# Patient Record
Sex: FEMALE | Race: WHITE | NOT HISPANIC OR LATINO | Employment: PART TIME | URBAN - METROPOLITAN AREA
[De-identification: names, ages, dates, MRNs, and addresses within clinical notes are randomized per-mention and may not be internally consistent; named-entity substitution may affect disease eponyms.]

---

## 2018-05-31 ENCOUNTER — HOSPITAL ENCOUNTER (EMERGENCY)
Facility: HOSPITAL | Age: 38
Discharge: HOME | End: 2018-06-01
Attending: EMERGENCY MEDICINE | Admitting: EMERGENCY MEDICINE

## 2018-05-31 ENCOUNTER — APPOINTMENT (EMERGENCY)
Dept: RADIOLOGY | Facility: HOSPITAL | Age: 38
End: 2018-05-31

## 2018-05-31 VITALS
RESPIRATION RATE: 20 BRPM | BODY MASS INDEX: 25.71 KG/M2 | HEART RATE: 88 BPM | SYSTOLIC BLOOD PRESSURE: 126 MMHG | TEMPERATURE: 98.1 F | HEIGHT: 66 IN | DIASTOLIC BLOOD PRESSURE: 75 MMHG | OXYGEN SATURATION: 98 % | WEIGHT: 160 LBS

## 2018-05-31 DIAGNOSIS — S92.411A: Primary | ICD-10-CM

## 2018-05-31 PROCEDURE — 73630 X-RAY EXAM OF FOOT: CPT

## 2018-05-31 RX ORDER — ACETAMINOPHEN 325 MG/1
650 TABLET ORAL ONCE
Status: COMPLETED | OUTPATIENT
Start: 2018-05-31 | End: 2018-05-31

## 2018-05-31 RX ADMIN — ACETAMINOPHEN 650 MG: 325 TABLET ORAL at 23:09

## 2018-06-01 PROCEDURE — 99283 EMERGENCY DEPT VISIT LOW MDM: CPT

## 2018-06-01 NOTE — ED PROVIDER NOTES
History  Chief Complaint   Patient presents with    Toe Injury     Patient presents to ED with c/o of a right great toe injury  States she hit her bed last night and has had severe pain and swelling  Non weightbearing     28-year-old female here for right great toe injury that occurred last night after accidentally kicking a bedpost   She tried managing pain at home but it is getting worse  She notes swelling and bruising  Pain is worse with weight-bearing and movement of the right great toe  No prior injuries surgeries this area  She has tried Motrin with no relief  Denies numbness tingling weakness  No lacerations or abrasions  No other injuries reported  No ankle pain  No knee pain  History provided by:  Patient   used: No    Toe Pain   Location:  Right toe injury  Quality:  Aching pain   Severity:  Mild  Onset quality:  Gradual  Duration:  1 day  Timing:  Constant  Progression:  Unchanged  Chronicity:  New  Context:  Accidentally kicked bed post  Relieved by:  Rest  Worsened by: Movement, weight bearing  Ineffective treatments:  Motrin  Associated symptoms: no abdominal pain, no chest pain, no congestion, no cough, no diarrhea, no ear pain, no fatigue, no fever, no headaches, no loss of consciousness, no myalgias, no nausea, no rash, no rhinorrhea, no shortness of breath, no sore throat, no vomiting and no wheezing        None       History reviewed  No pertinent past medical history  Past Surgical History:   Procedure Laterality Date    LUNG SURGERY         History reviewed  No pertinent family history  I have reviewed and agree with the history as documented  Social History   Substance Use Topics    Smoking status: Current Every Day Smoker     Packs/day: 1 00     Types: Cigarettes    Smokeless tobacco: Never Used    Alcohol use Yes      Comment: occ          Review of Systems   Constitutional: Negative for activity change, appetite change, chills, diaphoresis, fatigue, fever and unexpected weight change  HENT: Negative for congestion, ear pain, rhinorrhea, sinus pressure, sore throat and trouble swallowing  Eyes: Negative for photophobia and visual disturbance  Respiratory: Negative for apnea, cough, choking, chest tightness, shortness of breath, wheezing and stridor  Cardiovascular: Negative for chest pain, palpitations and leg swelling  Gastrointestinal: Negative for abdominal distention, abdominal pain, blood in stool, constipation, diarrhea, nausea and vomiting  Genitourinary: Negative for decreased urine volume, difficulty urinating, dysuria, enuresis, flank pain, frequency, hematuria and urgency  Musculoskeletal: Negative for arthralgias, myalgias, neck pain and neck stiffness  Right great toe injury   Skin: Negative for color change, pallor, rash and wound  Allergic/Immunologic: Negative  Neurological: Negative for dizziness, tremors, loss of consciousness, syncope, weakness, light-headedness, numbness and headaches  Hematological: Negative  Psychiatric/Behavioral: Negative  All other systems reviewed and are negative  Physical Exam  Physical Exam   Constitutional: She is oriented to person, place, and time  She appears well-developed and well-nourished  Non-toxic appearance  She does not have a sickly appearance  She does not appear ill  No distress  HENT:   Head: Normocephalic and atraumatic  Eyes: EOM and lids are normal  Pupils are equal, round, and reactive to light  Neck: Normal range of motion  Neck supple  Cardiovascular: Normal rate, regular rhythm, S1 normal, S2 normal, normal heart sounds, intact distal pulses and normal pulses  Exam reveals no gallop, no distant heart sounds, no friction rub and no decreased pulses  No murmur heard  Pulses:       Radial pulses are 2+ on the right side, and 2+ on the left side  Pulmonary/Chest: Effort normal and breath sounds normal  No accessory muscle usage   No apnea, no tachypnea and no bradypnea  No respiratory distress  She has no decreased breath sounds  She has no wheezes  She has no rhonchi  She has no rales  Abdominal: Soft  Normal appearance  She exhibits no distension and no mass  There is no tenderness  There is no rigidity, no rebound and no guarding  No hernia  Musculoskeletal: Normal range of motion  She exhibits no edema or deformity  Right foot: There is tenderness, bony tenderness and swelling  There is normal range of motion, normal capillary refill, no crepitus, no deformity and no laceration  Feet:    Neurological: She is alert and oriented to person, place, and time  No cranial nerve deficit  GCS eye subscore is 4  GCS verbal subscore is 5  GCS motor subscore is 6  GCS 15  AAOx3  Ambulating in department without difficulty  CN II-XII grossly intact  No focal neuro deficits  Skin: Skin is warm, dry and intact  No rash noted  She is not diaphoretic  No erythema  No pallor  Psychiatric: Her speech is normal    Nursing note and vitals reviewed        Vital Signs  ED Triage Vitals   Temperature Pulse Respirations Blood Pressure SpO2   05/31/18 2238 05/31/18 2238 05/31/18 2238 05/31/18 2238 05/31/18 2238   98 1 °F (36 7 °C) 88 20 126/75 98 %      Temp Source Heart Rate Source Patient Position - Orthostatic VS BP Location FiO2 (%)   05/31/18 2238 05/31/18 2238 05/31/18 2238 05/31/18 2238 --   Oral Monitor Sitting Right arm       Pain Score       05/31/18 2236       8           Vitals:    05/31/18 2238   BP: 126/75   Pulse: 88   Patient Position - Orthostatic VS: Sitting       Visual Acuity      ED Medications  Medications   acetaminophen (TYLENOL) tablet 650 mg (650 mg Oral Given 5/31/18 2309)       Diagnostic Studies  Results Reviewed     None                 XR foot 3+ views RIGHT   ED Interpretation by Kenroy Hanson PA-C (05/31 2350)   fx proximal phalanx right great toe                 Procedures  Procedures       Phone Contacts  ED Phone Contact    ED Course                               MDM  Number of Diagnoses or Management Options  Closed fracture of proximal phalanx of right great toe: new and requires workup  Diagnosis management comments: Differential diagnosis including but not limited to: sprain, strain, fracture, dislocation, contusion  Plan:  X-ray analgesia  Disposition pending  Amount and/or Complexity of Data Reviewed  Tests in the radiology section of CPT®: ordered and reviewed  Independent visualization of images, tracings, or specimens: yes    Risk of Complications, Morbidity, and/or Mortality  Presenting problems: low  Management options: low  General comments: 73-year-old female with right great toe injury  Found have fracture proximal phalanx  Placed in jacob tape, flat soled shoe, given crutches  Instructed to follow up with Podiatry  She understands agrees with plan  Discussed basic return parameters  She should remain nonweightbearing until seen by Orthopedics  Patient Progress  Patient progress: stable    CritCare Time    Disposition  Final diagnoses:   Closed fracture of proximal phalanx of right great toe     Time reflects when diagnosis was documented in both MDM as applicable and the Disposition within this note     Time User Action Codes Description Comment    5/31/2018 11:54 PM Amparo Faust Add [T16 544I] Closed fracture of proximal phalanx of right great toe       ED Disposition     ED Disposition Condition Comment    Discharge  [facility] discharge to home/self care      Condition at discharge: Good        Follow-up Information     Follow up With Specialties Details Why Contact [facility]  Call for follow up appointment to evaluate your toe fracture [address]  [address] [facility] Call for return to work; ask for [facility] office [address]  [facility] [address] [facility] Patient's Medications    No medications on file     No discharge procedures on file      ED Provider  Electronically Signed by           Nathanael Yuan PA-C  05/31/18 1530

## 2018-06-01 NOTE — DISCHARGE INSTRUCTIONS
Return to the Emergency Department sooner if increased pain, swelling, numbness, weakness, vomiting, difficulty breathing, redness  Ice, elevate  Toe Fracture   WHAT YOU NEED TO KNOW:   A toe fracture is a break in 1 or more of the bones in your toe  It is most commonly caused by a direct blow to the toe  The bones in your toe may just be broken, or they may be out of place or   DISCHARGE INSTRUCTIONS:   Return to the emergency department if:   · Blood soaks through your bandage  · You have severe pain in your toe  · Your toe is cold or numb  Contact your healthcare provider if:   · You have a fever  · Your pain does not go away, even after treatment  · Your toe continues to hurt even after it has healed  · You have questions or concerns about your condition or care  Medicines: You may need any of the following:  · NSAIDs , such as ibuprofen, help decrease swelling, pain, and fever  This medicine is available with or without a doctor's order  NSAIDs can cause stomach bleeding or kidney problems in certain people  If you take blood thinner medicine, always ask your healthcare provider if NSAIDs are safe for you  Always read the medicine label and follow directions  · Prescription pain medicine  may be given  Ask your healthcare provider how to take this medicine safely  Some prescription pain medicines contain acetaminophen  Do not take other medicines that contain acetaminophen without talking to your healthcare provider  Too much acetaminophen may cause liver damage  Prescription pain medicine may cause constipation  Ask your healthcare provider how to prevent or treat constipation  · Antibiotics  treat a bacterial infection  You may need antibiotics if you have an open wound  · Take your medicine as directed  Contact your healthcare provider if you think your medicine is not helping or if you have side effects  Tell him of her if you are allergic to any medicine   Keep a list of the medicines, vitamins, and herbs you take  Include the amounts, and when and why you take them  Bring the list or the pill bottles to follow-up visits  Carry your medicine list with you in case of an emergency  Self-care:   · Use jacob tape, an elastic bandage, or a splint as directed  These help keep your toe in its correct position as it heals  Jacob tape is when your fractured toe and the toe next to it are taped together  · Use support devices  including a cane, crutches, walking boot, or hard soled shoe as directed  These help protect your broken toe and limit movement so it can heal     · Rest  your toe so that it can heal  Return to normal activities as directed  · Apply ice  on your toe for 15 to 20 minutes every hour or as directed  Use an ice pack, or put crushed ice in a plastic bag  Cover it with a towel  Ice helps prevent tissue damage and decreases swelling and pain  · Elevate  your toe above the level of your heart as often as you can  This will help decrease swelling and pain  Prop your toe on pillows or blankets to keep it elevated comfortably  Follow up with your healthcare provider as directed: You may need to see a podiatrist in 1 to 2 weeks  Write down your questions so you remember to ask them during your visits  © 2017 [employer] Information is for End User's use only and may not be sold, redistributed or otherwise used for commercial purposes  All illustrations and images included in CareNotes® are the copyrighted property of A [employer] M [employer]  or [employer]  The above information is an  only  It is not intended as medical advice for individual conditions or treatments  Talk to your doctor, nurse or pharmacist before following any medical regimen to see if it is safe and effective for you

## 2018-06-30 ENCOUNTER — HOSPITAL ENCOUNTER (EMERGENCY)
Facility: HOSPITAL | Age: 38
Discharge: HOME | End: 2018-06-30
Attending: EMERGENCY MEDICINE | Admitting: EMERGENCY MEDICINE

## 2018-06-30 VITALS
RESPIRATION RATE: 18 BRPM | WEIGHT: 164.02 LBS | OXYGEN SATURATION: 98 % | HEART RATE: 99 BPM | BODY MASS INDEX: 26.47 KG/M2 | SYSTOLIC BLOOD PRESSURE: 123 MMHG | TEMPERATURE: 98.7 F | DIASTOLIC BLOOD PRESSURE: 64 MMHG

## 2018-06-30 DIAGNOSIS — R21 RASH AND NONSPECIFIC SKIN ERUPTION: Primary | ICD-10-CM

## 2018-06-30 DIAGNOSIS — N39.0 UTI (URINARY TRACT INFECTION): ICD-10-CM

## 2018-06-30 PROCEDURE — 99283 EMERGENCY DEPT VISIT LOW MDM: CPT

## 2018-06-30 RX ORDER — PREDNISONE 20 MG/1
60 TABLET ORAL ONCE
Status: COMPLETED | OUTPATIENT
Start: 2018-06-30 | End: 2018-06-30

## 2018-06-30 RX ORDER — PREDNISONE 20 MG/1
60 TABLET ORAL DAILY
Qty: 12 TABLET | Refills: 0 | Status: SHIPPED | OUTPATIENT
Start: 2018-06-30 | End: 2018-07-04

## 2018-06-30 RX ORDER — CEPHALEXIN 500 MG/1
500 CAPSULE ORAL EVERY 12 HOURS SCHEDULED
Qty: 14 CAPSULE | Refills: 0 | Status: SHIPPED | OUTPATIENT
Start: 2018-06-30 | End: 2018-07-07

## 2018-06-30 RX ADMIN — PREDNISONE 60 MG: 20 TABLET ORAL at 21:37

## 2018-07-01 NOTE — ED PROVIDER NOTES
History  Chief Complaint   Patient presents with    Ankle Swelling     B/L ankle swelling  Pt states right ankle feels like it's burning  Per pt-red blotchy rash up b/l legs today, no resolved  60-year-old female here for rash of bilateral ankles and lower legs  Began today  She notes she had put on her stockings when she started developing a burning itching sensation of both feet as well as ankles and calves  She states it is intensely itchy and despite taking no stockings off she continues to have symptoms  She describes it as a blotchy rash that is now extending up her legs  She notes she took Augmentin for the first time last night for UTI  She has never had Augmentin before  Denies fever, chills, n/v, chest pain  No pain in calves  No h/o DVT or PE  No recent travels, traumas, or surgeries  Her sister is a nurse and raised the patient's concern for infection or clot  Since taking her stockings it seems to have stopped spreading  History provided by:  Patient   used: No    Rash   Location: Bilateral lower extremities  Quality: burning, itchiness and redness    Severity:  Moderate  Onset quality:  Gradual  Duration:  1 day  Timing:  Constant  Progression:  Unchanged  Chronicity:  New  Context comment:  Began after putting on stockings  Relieved by:  Nothing  Worsened by:  Nothing  Ineffective treatments:  None tried  Associated symptoms: no abdominal pain, no diarrhea, no fatigue, no fever, no headaches, no hoarse voice, no induration, no joint pain, no myalgias, no nausea, no periorbital edema, no shortness of breath, no sore throat, no throat swelling, no tongue swelling, no URI, not vomiting and not wheezing        None       History reviewed  No pertinent past medical history  Past Surgical History:   Procedure Laterality Date    LUNG SURGERY      LUNG SURGERY Left        History reviewed  No pertinent family history    I have reviewed and agree with the history as documented  Social History   Substance Use Topics    Smoking status: Current Every Day Smoker     Packs/day: 1 00     Types: Cigarettes    Smokeless tobacco: Never Used    Alcohol use Yes      Comment: occ  Review of Systems   Constitutional: Negative for activity change, appetite change, chills, diaphoresis, fatigue, fever and unexpected weight change  HENT: Negative for congestion, hoarse voice, rhinorrhea, sinus pressure, sore throat and trouble swallowing  Eyes: Negative for photophobia and visual disturbance  Respiratory: Negative for apnea, cough, choking, chest tightness, shortness of breath, wheezing and stridor  Cardiovascular: Negative for chest pain, palpitations and leg swelling  Gastrointestinal: Negative for abdominal distention, abdominal pain, blood in stool, constipation, diarrhea, nausea and vomiting  Genitourinary: Negative for decreased urine volume, difficulty urinating, dysuria, enuresis, flank pain, frequency, hematuria and urgency  Musculoskeletal: Negative for arthralgias, myalgias, neck pain and neck stiffness  Skin: Positive for rash  Negative for color change, pallor and wound  Allergic/Immunologic: Negative  Neurological: Negative for dizziness, tremors, syncope, weakness, light-headedness, numbness and headaches  Hematological: Negative  Psychiatric/Behavioral: Negative  All other systems reviewed and are negative  Physical Exam  Physical Exam   Constitutional: She is oriented to person, place, and time  She appears well-developed and well-nourished  Non-toxic appearance  She does not have a sickly appearance  She does not appear ill  No distress  HENT:   Head: Normocephalic and atraumatic  Eyes: EOM and lids are normal  Pupils are equal, round, and reactive to light  Neck: Normal range of motion  Neck supple     Cardiovascular: Normal rate, regular rhythm, S1 normal, S2 normal, normal heart sounds, intact distal pulses and normal pulses  Exam reveals no gallop, no distant heart sounds, no friction rub and no decreased pulses  No murmur heard  Pulses:       Radial pulses are 2+ on the right side, and 2+ on the left side  Pulmonary/Chest: Effort normal and breath sounds normal  No accessory muscle usage  No apnea, no tachypnea and no bradypnea  No respiratory distress  She has no decreased breath sounds  She has no wheezes  She has no rhonchi  She has no rales  Abdominal: Soft  Normal appearance  There is no tenderness  There is no rigidity  Musculoskeletal: Normal range of motion  She exhibits no edema, tenderness or deformity  Neurological: She is alert and oriented to person, place, and time  No cranial nerve deficit  GCS eye subscore is 4  GCS verbal subscore is 5  GCS motor subscore is 6  GCS 15  AAOx3  Ambulating in department without difficulty  CN II-XII grossly intact  No focal neuro deficits  Skin: Skin is warm, dry and intact  Rash noted  Rash is macular  She is not diaphoretic  No pallor  There is a macular rash which is blanchable on bilateral lower extremities  She is tender in these areas  There is no warmth  Ill-defined borders  It is not localized anywhere  There is no tenderness over the bilateral calves  There is no palpable cords  There is no swelling in the legs or ankles  The rash in particular on her left lower extremity appears to follow a distribution around where her stocking had ended on her leg  She states this is the exact location with a ended  Psychiatric: Her speech is normal    Nursing note and vitals reviewed        Vital Signs  ED Triage Vitals [06/30/18 2115]   Temperature Pulse Respirations Blood Pressure SpO2   98 7 °F (37 1 °C) 99 18 123/64 98 %      Temp Source Heart Rate Source Patient Position - Orthostatic VS BP Location FiO2 (%)   Oral Monitor Sitting Left arm --      Pain Score       6           Vitals:    06/30/18 2115   BP: 123/64   Pulse: 99   Patient Position - Orthostatic VS: Sitting       Visual Acuity      ED Medications  Medications   predniSONE tablet 60 mg (60 mg Oral Given 6/30/18 4555)       Diagnostic Studies  Results Reviewed     None                 No orders to display              Procedures  Procedures       Phone Contacts  ED Phone Contact    ED Course                               MDM  Number of Diagnoses or Management Options  Rash and nonspecific skin eruption: new and requires workup  UTI (urinary tract infection): new and requires workup  Diagnosis management comments: DDX including but not limited to: allergic reaction, urticaria, cellulitis, contact dermatitis, allergic dermatitis, poison ivy/oak/sumac, vasculitis, herpes zoster, infectious etiology, scabies  Risk of Complications, Morbidity, and/or Mortality  Presenting problems: low  Management options: low  General comments: Plan/MDM:  40year-old with a rash  I do not suspect DVT as she has no risk factors for this and the rash in redness is bilateral making is much less likely to be a DVT  Additionally this is unlikely to be infectious as it is bilateral   The distribution of the rash itself could indicate a contact dermatitis, possibly from the stockings  She also took Augmentin for the 1st time before this rash developed and so back be the cause of her allergic response  Will change her Augmentin for her UTI to Keflex  We will start her on burst of steroids for allergic dermatitis  She will follow up closely with her PCP and return to ER for worsening symptoms      Patient Progress  Patient progress: stable    CritCare Time    Disposition  Final diagnoses:   Rash and nonspecific skin eruption - bilateral legs   UTI (urinary tract infection)     Time reflects when diagnosis was documented in both MDM as applicable and the Disposition within this note     Time User Action Codes Description Comment    6/30/2018  9:30 PM Irma CEDEÑO Add [R21] Rash and nonspecific skin eruption 6/30/2018  9:31 PM Fawad Hallmark L Modify [R21] Rash and nonspecific skin eruption bilateral legs    6/30/2018  9:31 PM Fawad Hallmark L Add [N39 0] UTI (urinary tract infection)       ED Disposition     ED Disposition Condition Comment    Discharge  98 [facility] discharge to home/self care  Condition at discharge: Good        Follow-up Information     Follow up With Specialties Details Why Contact Info Additional Information    Chloe Dow MD Family Medicine Call for family doctor follow up 21   [address] [facility] [phone]       [address] [facility] Emergency Department Emergency Medicine Go to If symptoms worsen [address] [facility] [address]  [phone] [place] ED, [address], [place], [address]          Discharge Medication List as of 6/30/2018  9:32 PM      START taking these medications    Details   cephalexin (KEFLEX) 500 mg capsule Take 1 capsule (500 mg total) by mouth every 12 (twelve) hours for 7 days, Starting Sat 6/30/2018, Until Sat 7/7/2018, Print      predniSONE 20 mg tablet Take 3 tablets (60 mg total) by mouth daily for 4 days, Starting Sat 6/30/2018, Until Wed 7/4/2018, Print           No discharge procedures on file      ED Provider  Electronically Signed by           Melissa Parks PA-C  06/30/18 8751

## 2018-07-01 NOTE — DISCHARGE INSTRUCTIONS
Return to the Emergency Department sooner if increased rash, fever, vomiting, difficulty breathing, lethargy, pain  Acute Rash   WHAT YOU NEED TO KNOW:   A rash is irritation, redness, or itchiness in the skin or mucus membranes  Mucus membranes are areas such as the lining of your nose or throat  Acute means the rash starts suddenly, worsens quickly, and lasts a short time  An acute rash may be caused by a disease, such as hepatitis or vasculitis  The rash may be a reaction to something you are allergic to, such as certain foods, or latex  Certain medicines, including antibiotics, NSAIDs, prescription pain medicine, and aspirin can also cause a rash  DISCHARGE INSTRUCTIONS:   Return to the emergency department if:   · You have sudden trouble breathing or chest pain  · You are vomiting, have a headache or muscle aches, and your throat hurts  Contact your healthcare provider if:   · You have a fever  · You get open wounds from scratching your skin, or you have a wound that is red, swollen, or painful  · Your rash lasts longer than 3 months  · You have swelling or pain in your joints  · You have questions or concerns about your condition or care  Medicines:  If your rash does not go away on its own, you may need the following medicines:  · Antihistamines  may be given to help decrease itching  · Steroids  may be given to decrease inflammation  · Antibiotics  help fight or prevent a bacterial infection  · Take your medicine as directed  Contact your healthcare provider if you think your medicine is not helping or if you have side effects  Tell him of her if you are allergic to any medicine  Keep a list of the medicines, vitamins, and herbs you take  Include the amounts, and when and why you take them  Bring the list or the pill bottles to follow-up visits  Carry your medicine list with you in case of an emergency    Prevent a rash or care for your skin when you have a rash:  Dry skin can lead to more problems  Do not scratch your skin if it itches  You may cause a skin infection by scratching  The following may prevent dry skin, and help your skin look better:  · Use thick cream lotions or petroleum jelly to help soothe your rash  These products work well on areas with thick skin, such as your feet  Cool compresses may also be used to soothe your skin  Apply a cool compress or a cool, wet towel, and then cover it with a dry towel  · Use lukewarm water when you bathe  Hot water may damage your skin more  Pat your skin dry  Do not rub your skin with a towel  · Use detergents, soaps, shampoos, and bubble baths made for sensitive skin  Wear clothes that are made of cotton instead of nylon or wool  Cotton is softer, so it will not hurt your skin as much  Follow up with your healthcare provider as directed: You may need to see a dermatologist if healthcare providers do not know what is causing your rash  You may also need to see a dermatologist if your rash does not get better even with treatment  You may need to see a dietitian if you have allergies to foods  Write down your questions so you remember to ask them during your visits  © 2017 [employer]  Information is for End User's use only and may not be sold, redistributed or otherwise used for commercial purposes  All illustrations and images included in CareNotes® are the copyrighted property of [employer]  or [employer]  The above information is an  only  It is not intended as medical advice for individual conditions or treatments  Talk to your doctor, nurse or pharmacist before following any medical regimen to see if it is safe and effective for you

## 2019-01-27 ENCOUNTER — HOSPITAL ENCOUNTER (EMERGENCY)
Facility: HOSPITAL | Age: 39
Discharge: HOME | End: 2019-01-27
Attending: EMERGENCY MEDICINE

## 2019-01-27 VITALS
BODY MASS INDEX: 28.49 KG/M2 | HEART RATE: 97 BPM | TEMPERATURE: 97.6 F | OXYGEN SATURATION: 100 % | WEIGHT: 177.25 LBS | DIASTOLIC BLOOD PRESSURE: 83 MMHG | HEIGHT: 66 IN | SYSTOLIC BLOOD PRESSURE: 141 MMHG | RESPIRATION RATE: 18 BRPM

## 2019-01-27 DIAGNOSIS — S01.81XA FACIAL LACERATION, INITIAL ENCOUNTER: Primary | ICD-10-CM

## 2019-01-27 PROCEDURE — 90715 TDAP VACCINE 7 YRS/> IM: CPT | Performed by: EMERGENCY MEDICINE

## 2019-01-27 PROCEDURE — 99282 EMERGENCY DEPT VISIT SF MDM: CPT

## 2019-01-27 PROCEDURE — 90471 IMMUNIZATION ADMIN: CPT

## 2019-01-27 RX ORDER — LIDOCAINE HYDROCHLORIDE 10 MG/ML
5 INJECTION, SOLUTION EPIDURAL; INFILTRATION; INTRACAUDAL; PERINEURAL ONCE
Status: COMPLETED | OUTPATIENT
Start: 2019-01-27 | End: 2019-01-27

## 2019-01-27 RX ADMIN — TETANUS TOXOID, REDUCED DIPHTHERIA TOXOID AND ACELLULAR PERTUSSIS VACCINE, ADSORBED 0.5 ML: 5; 2.5; 8; 8; 2.5 SUSPENSION INTRAMUSCULAR at 06:03

## 2019-01-27 RX ADMIN — LIDOCAINE HYDROCHLORIDE 5 ML: 10 INJECTION, SOLUTION EPIDURAL; INFILTRATION; INTRACAUDAL; PERINEURAL at 06:02

## 2019-01-27 NOTE — DISCHARGE INSTRUCTIONS
Laceration   WHAT YOU NEED TO KNOW:   A laceration is an injury to the skin and the soft tissue underneath it  Lacerations happen when you are cut or hit by something  They can happen anywhere on the body  DISCHARGE INSTRUCTIONS:   Return to the emergency department if:   · You have heavy bleeding or bleeding that does not stop after 10 minutes of holding firm, direct pressure over the wound  · Your wound opens up  Contact your healthcare provider if:   · You have a fever or chills  · Your laceration is red, warm, or swollen  · You have red streaks on your skin coming from your wound  · You have white or yellow drainage from the wound that smells bad  · You have pain that gets worse, even after treatment  · You have questions or concerns about your condition or care  Medicines:   · Prescription pain medicine  may be given  Ask how to take this medicine safely  · Antibiotics  help treat or prevent a bacterial infection  · Take your medicine as directed  Contact your healthcare provider if you think your medicine is not helping or if you have side effects  Tell him or her if you are allergic to any medicine  Keep a list of the medicines, vitamins, and herbs you take  Include the amounts, and when and why you take them  Bring the list or the pill bottles to follow-up visits  Carry your medicine list with you in case of an emergency  Care for your wound as directed:   · Do not get your wound wet  until your healthcare provider says it is okay  Do not soak your wound in water  Do not go swimming until your healthcare provider says it is okay  Carefully wash the wound with soap and water  Gently pat the area dry or allow it to air dry  · Change your bandages  when they get wet, dirty, or after washing  Apply new, clean bandages as directed  Do not apply elastic bandages or tape too tight  Do not put powders or lotions over your incision  · Apply antibiotic ointment as directed  Your healthcare provider may give you antibiotic ointment to put over your wound if you have stitches  If you have strips of tape over your incision, let them dry up and fall off on their own  If they do not fall off within 14 days, gently remove them  If you have glue over your wound, do not remove or pick at it  If your glue comes off, do not replace it with glue that you have at home  · Check your wound every day for signs of infection such as swelling, redness, or pus  Self-care:   · Apply ice  on your wound for 15 to 20 minutes every hour or as directed  Use an ice pack, or put crushed ice in a plastic bag  Cover it with a towel  Ice helps prevent tissue damage and decreases swelling and pain  · Use a splint as directed  A splint will decrease movement and stress on your wound  It may help it heal faster  A splint may be used for lacerations over joints or areas of your body that bend  Ask your healthcare provider how to apply and remove a splint  · Decrease scarring of your wound  by applying ointments as directed  Do not apply ointments until your healthcare provider says it is okay  You may need to wait until your wound is healed  Ask which ointment to buy and how often to use it  After your wound is healed, use sunscreen over the area when you are out in the sun  You should do this for at least 6 months to 1 year after your injury  Follow up with your healthcare provider as directed: You may need to follow up in 24 to 48 hours to have your wound checked for infection  You will need to return in 3 to 14 days if you have stitches or staples so they can be removed  Care for your wound as directed to prevent infection and help it heal  Write down your questions so you remember to ask them during your visits  © 2017 [employer] Information is for End User's use only and may not be sold, redistributed or otherwise used for commercial purposes   All illustrations and images included in [facility] are the copyrighted property of A [employer]  or [employer]  The above information is an  only  It is not intended as medical advice for individual conditions or treatments  Talk to your doctor, nurse or pharmacist before following any medical regimen to see if it is safe and effective for you
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
## 2021-06-16 ENCOUNTER — APPOINTMENT (EMERGENCY)
Dept: RADIOLOGY | Facility: HOSPITAL | Age: 41
End: 2021-06-16
Payer: COMMERCIAL

## 2021-06-16 ENCOUNTER — HOSPITAL ENCOUNTER (EMERGENCY)
Facility: HOSPITAL | Age: 41
Discharge: HOME | End: 2021-06-16
Attending: EMERGENCY MEDICINE
Payer: COMMERCIAL

## 2021-06-16 VITALS
SYSTOLIC BLOOD PRESSURE: 137 MMHG | DIASTOLIC BLOOD PRESSURE: 88 MMHG | HEART RATE: 86 BPM | OXYGEN SATURATION: 96 % | TEMPERATURE: 97.8 F | RESPIRATION RATE: 18 BRPM

## 2021-06-16 DIAGNOSIS — J20.9 ACUTE BRONCHITIS: ICD-10-CM

## 2021-06-16 PROCEDURE — 99283 EMERGENCY DEPT VISIT LOW MDM: CPT

## 2021-06-16 PROCEDURE — 99284 EMERGENCY DEPT VISIT MOD MDM: CPT | Performed by: EMERGENCY MEDICINE

## 2021-06-16 PROCEDURE — 71046 X-RAY EXAM CHEST 2 VIEWS: CPT

## 2021-06-16 RX ORDER — AZITHROMYCIN 250 MG/1
TABLET, FILM COATED ORAL
Qty: 6 TABLET | Refills: 0 | Status: SHIPPED | OUTPATIENT
Start: 2021-06-16 | End: 2021-06-20

## 2021-06-16 RX ORDER — ALBUTEROL SULFATE 90 UG/1
2 AEROSOL, METERED RESPIRATORY (INHALATION) ONCE
Status: COMPLETED | OUTPATIENT
Start: 2021-06-16 | End: 2021-06-16

## 2021-06-16 RX ORDER — AMOXICILLIN AND CLAVULANATE POTASSIUM 875; 125 MG/1; MG/1
1 TABLET, FILM COATED ORAL EVERY 12 HOURS
Qty: 14 TABLET | Refills: 0 | Status: SHIPPED | OUTPATIENT
Start: 2021-06-16 | End: 2021-06-16

## 2021-06-16 RX ADMIN — ALBUTEROL SULFATE 2 PUFF: 90 AEROSOL, METERED RESPIRATORY (INHALATION) at 20:21

## 2021-06-16 NOTE — ED PROVIDER NOTES
History  Chief Complaint   Patient presents with    Cough     Pt reports being seen at urgent care, tested for COVID with negative results, cough is getting worse, steroids are not improving symptoms       History provided by:  Patient   used: No    Cough  Cough characteristics:  Productive  Sputum characteristics:  Yellow  Severity:  Moderate  Onset quality:  Gradual  Duration:  1 week  Timing:  Constant  Progression:  Worsening  Chronicity:  New  Smoker: yes    Relieved by:  Nothing  Worsened by:  Nothing  Ineffective treatments:  None tried  Associated symptoms: no chest pain, no chills, no ear pain, no fever, no rash, no shortness of breath and no sore throat        None       History reviewed  No pertinent past medical history  Past Surgical History:   Procedure Laterality Date    LUNG SURGERY      LUNG SURGERY Left        History reviewed  No pertinent family history  I have reviewed and agree with the history as documented  E-Cigarette/Vaping     E-Cigarette/Vaping Substances     Social History     Tobacco Use    Smoking status: Current Every Day Smoker     Packs/day: 1 00     Types: Cigarettes    Smokeless tobacco: Never Used   Substance Use Topics    Alcohol use: Yes     Comment: occ   Drug use: No       Review of Systems   Constitutional: Negative for chills and fever  HENT: Negative for ear pain and sore throat  Eyes: Negative for pain and visual disturbance  Respiratory: Positive for cough  Negative for shortness of breath  Cardiovascular: Negative for chest pain and palpitations  Gastrointestinal: Negative for abdominal pain, nausea and vomiting  Genitourinary: Negative for dysuria and hematuria  Musculoskeletal: Negative for arthralgias, back pain, neck pain and neck stiffness  Skin: Negative for color change and rash  Neurological: Negative for seizures and syncope  All other systems reviewed and are negative        Physical Exam  Physical Exam  Vitals and nursing note reviewed  Constitutional:       General: She is not in acute distress  Appearance: She is well-developed  HENT:      Head: Normocephalic and atraumatic  Eyes:      Conjunctiva/sclera: Conjunctivae normal    Cardiovascular:      Rate and Rhythm: Normal rate and regular rhythm  Heart sounds: No murmur heard  Pulmonary:      Effort: Pulmonary effort is normal  No respiratory distress  Breath sounds: Normal breath sounds  Abdominal:      Palpations: Abdomen is soft  Tenderness: There is no abdominal tenderness  Musculoskeletal:      Cervical back: Neck supple  Skin:     General: Skin is warm and dry  Capillary Refill: Capillary refill takes less than 2 seconds  Neurological:      General: No focal deficit present  Mental Status: She is alert and oriented to person, place, and time  Vital Signs  ED Triage Vitals [06/16/21 1842]   Temperature Pulse Respirations Blood Pressure SpO2   97 8 °F (36 6 °C) 86 18 137/88 96 %      Temp Source Heart Rate Source Patient Position - Orthostatic VS BP Location FiO2 (%)   Oral Monitor Lying Right arm --      Pain Score       --           Vitals:    06/16/21 1842   BP: 137/88   Pulse: 86   Patient Position - Orthostatic VS: Lying         Visual Acuity      ED Medications  Medications   albuterol (PROVENTIL HFA,VENTOLIN HFA) inhaler 2 puff (2 puffs Inhalation Given 6/16/21 2021)       Diagnostic Studies  Results Reviewed     None                 XR chest 2 views   Final Result by Jesse Fuentes MD (06/16 2301)      No acute cardiopulmonary disease  Workstation performed: [id number]                    Procedures  Procedures         ED Course                             SBIRT 20yo+      Most Recent Value   SBIRT (25 yo +)   In order to provide better care to our patients, we are screening all of our patients for alcohol and drug use  Would it be okay to ask you these screening questions?   Yes Filed at: 06/16/2021 1846   Initial Alcohol Screen: US AUDIT-C    1  How often do you have a drink containing alcohol?  0 Filed at: 06/16/2021 1846   2  How many drinks containing alcohol do you have on a typical day you are drinking? 0 Filed at: 06/16/2021 1846   3a  Male UNDER 65: How often do you have five or more drinks on one occasion? 0 Filed at: 06/16/2021 1846   3b  FEMALE Any Age, or MALE 65+: How often do you have 4 or more drinks on one occassion? 0 Filed at: 06/16/2021 1846   Audit-C Score  0 Filed at: 06/16/2021 1846   JUNG: How many times in the past year have you    Used an illegal drug or used a prescription medication for non-medical reasons? Never Filed at: 06/16/2021 1846                    MDM  Number of Diagnoses or Management Options  Acute bronchitis: new and requires workup     Amount and/or Complexity of Data Reviewed  Tests in the radiology section of CPT®: reviewed and ordered  Review and summarize past medical records: yes  Independent visualization of images, tracings, or specimens: yes        Disposition  Final diagnoses:   Acute bronchitis     Time reflects when diagnosis was documented in both MDM as applicable and the Disposition within this note     Time User Action Codes Description Comment    6/16/2021  8:12 PM Henna CEDEÑO Add [J20 9] Acute bronchitis     6/16/2021  8:21 PM Yarely Paiz Modify [J20 9] Acute bronchitis       ED Disposition     ED Disposition Condition Date/Time Comment    Discharge Stable Wed Jun 16, 2021  8:12 PM Alla Espinoza discharge to home/self care              Follow-up Information     Follow up With Specialties Details Why [address], [address] [facility], Nurse Practitioner   [address] [facility] [address]  [phone]            Discharge Medication List as of 6/16/2021  8:22 PM      START taking these medications    Details   azithromycin (ZITHROMAX) 250 mg tablet Take 2 tablets today then 1 tablet daily x 4 days, Print           No discharge procedures on file      PDMP Review     None          ED Provider  Electronically Signed by           Raven Eller MD  07/06/21 2857

## 2021-06-16 NOTE — Clinical Note
Manolo Conradsherwin was seen and treated in our emergency department on 6/16/2021  Diagnosis:     Alla  may return to work on return date  She may return on this date: 06/18/2021         If you have any questions or concerns, please don't hesitate to call        Corrina Winslow MD    ______________________________           _______________          _______________  Hospital Representative                              Date                                Time

## 2021-06-16 NOTE — Clinical Note
Bradendavid Lay was seen and treated in our emergency department on 6/16/2021  Diagnosis:     Alla  may return to work on return date  She may return on this date: 06/18/2021         If you have any questions or concerns, please don't hesitate to call        Adelso Mckeon MD    ______________________________           _______________          _______________  Hospital Representative                              Date                                Time

## 2023-01-16 ENCOUNTER — OFFICE VISIT (OUTPATIENT)
Dept: URGENT CARE | Facility: CLINIC | Age: 43
End: 2023-01-16

## 2023-01-16 VITALS
OXYGEN SATURATION: 99 % | RESPIRATION RATE: 18 BRPM | TEMPERATURE: 98 F | BODY MASS INDEX: 28.25 KG/M2 | HEART RATE: 95 BPM | HEIGHT: 67 IN | WEIGHT: 180 LBS

## 2023-01-16 DIAGNOSIS — M77.8 LEFT ELBOW TENDINITIS: Primary | ICD-10-CM

## 2023-01-16 RX ORDER — NAPROXEN 500 MG/1
500 TABLET ORAL 2 TIMES DAILY WITH MEALS
Qty: 30 TABLET | Refills: 0 | Status: SHIPPED | OUTPATIENT
Start: 2023-01-16

## 2023-01-16 NOTE — PROGRESS NOTES
[id number]        NAME: Elis Chilel is a 40 y o  female  : 1980    MRN: [id number]  DATE: 2023  TIME: 9:30 AM    Assessment and Plan   Left elbow tendinitis [M77 8]  1  Left elbow tendinitis  Ambulatory Referral to Orthopedic Surgery    naproxen (Naprosyn) 500 mg tablet        Suggested taking Naproxen, icing, and wearing brace   Placed referral to Ortho given chronicity and nature of her job     Patient Instructions       Follow up with PCP in 3-5 days  Proceed to  ER if symptoms worsen  Chief Complaint     Chief Complaint   Patient presents with   • Arm Pain     1 month arm pain, Close to elbow on top, feels like something is  torn  History of Present Illness       Patient is a 41 yo female who presents with a cc of right elbow pain x 1 month  She denies any trauma or injury to the elbow  She works as a  and 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  She has been occasionally icing and taking Motrin  She states she wore a brace for some time but she thinks it made the symptoms worse  The pain is in the lateral aspect of the elbow and the arm just distal to this point  At times the pain radiates down into the top of the forearm  She reports that the pain is worse with movement and lifting  At times she has a harder time gripping  She denies numbness, tingling, weakness  No bruising or swelling  Review of Systems   Review of Systems   Constitutional: Negative for fever  Musculoskeletal: Positive for arthralgias  Negative for joint swelling  R elbow pain    Skin: Negative for color change  Neurological: Negative for weakness and numbness           Current Medications       Current Outpatient Medications:   •  naproxen (Naprosyn) 500 mg tablet, Take 1 tablet (500 mg total) by mouth 2 (two) times a day with meals, Disp: 30 tablet, Rfl: 0    Current Allergies     Allergies as of 2023   • (No Known Allergies)            The following portions of the patient's history were reviewed and updated as appropriate: allergies, current medications, past family history, past medical history, past social history, past surgical history and problem list      Past Medical History:   Diagnosis Date   • No pertinent past medical history        Past Surgical History:   Procedure Laterality Date   • LUNG SURGERY     • LUNG SURGERY Left        History reviewed  No pertinent family history  Medications have been verified  Objective   Pulse 95   Temp 98 °F (36 7 °C)   Resp 18   Ht 5' 7" (1 702 m)   Wt 81 6 kg (180 lb)   LMP 01/15/2023   SpO2 99%   BMI 28 19 kg/m²        Physical Exam     Physical Exam  Constitutional:       General: She is not in acute distress  Cardiovascular:      Rate and Rhythm: Normal rate  Pulmonary:      Effort: Pulmonary effort is normal    Musculoskeletal:      Comments: Right elbow: tenderness about 1 cm to 2 cm distal to lateral epicondyle  Pain reproduced with resisted wrist extension  Full flexion and extension of elbow  NVI distally      Neurological:      Mental Status: She is alert     Psychiatric:         Mood and Affect: Mood normal          Behavior: Behavior normal

## 2023-01-20 ENCOUNTER — TELEPHONE (OUTPATIENT)
Dept: OBGYN CLINIC | Facility: HOSPITAL | Age: 43
End: 2023-01-20

## 2023-01-20 NOTE — TELEPHONE ENCOUNTER
Caller: Patient    Doctor: Dr Aly Christina    Reason for call: Patient called stating she missed her appt this morning and she was looking to reschedule  While attempting to r/s appt patient stated office was calling her and call was disconnected      Call back#: [phone]

## 2023-01-25 ENCOUNTER — OFFICE VISIT (OUTPATIENT)
Dept: OBGYN CLINIC | Facility: CLINIC | Age: 43
End: 2023-01-25

## 2023-01-25 ENCOUNTER — APPOINTMENT (OUTPATIENT)
Dept: RADIOLOGY | Facility: CLINIC | Age: 43
End: 2023-01-25

## 2023-01-25 VITALS
RESPIRATION RATE: 18 BRPM | WEIGHT: 188 LBS | BODY MASS INDEX: 29.51 KG/M2 | SYSTOLIC BLOOD PRESSURE: 120 MMHG | DIASTOLIC BLOOD PRESSURE: 74 MMHG | HEIGHT: 67 IN | OXYGEN SATURATION: 96 % | HEART RATE: 97 BPM

## 2023-01-25 DIAGNOSIS — M25.521 PAIN IN RIGHT ELBOW: ICD-10-CM

## 2023-01-25 DIAGNOSIS — M77.11 RIGHT LATERAL EPICONDYLITIS: Primary | ICD-10-CM

## 2023-01-25 RX ORDER — METHYLPREDNISOLONE ACETATE 40 MG/ML
1 INJECTION, SUSPENSION INTRA-ARTICULAR; INTRALESIONAL; INTRAMUSCULAR; SOFT TISSUE
Status: COMPLETED | OUTPATIENT
Start: 2023-01-25 | End: 2023-01-25

## 2023-01-25 RX ORDER — LIDOCAINE HYDROCHLORIDE 10 MG/ML
1 INJECTION, SOLUTION INFILTRATION; PERINEURAL
Status: COMPLETED | OUTPATIENT
Start: 2023-01-25 | End: 2023-01-25

## 2023-01-25 RX ADMIN — METHYLPREDNISOLONE ACETATE 1 ML: 40 INJECTION, SUSPENSION INTRA-ARTICULAR; INTRALESIONAL; INTRAMUSCULAR; SOFT TISSUE at 09:17

## 2023-01-25 RX ADMIN — LIDOCAINE HYDROCHLORIDE 1 ML: 10 INJECTION, SOLUTION INFILTRATION; PERINEURAL at 09:17

## 2023-01-25 NOTE — LETTER
January 25, 2023     Chirag Joy DO  [address] [facility] [address] [facility]  [address]    Patient: Rinku oCx   YOB: 1980   Date of Visit: 1/25/2023       Dear Dr Lokesh Daily: Thank you for referring Rinku Cox to me for evaluation  Below are my notes for this consultation  If you have questions, please do not hesitate to call me  I look forward to following your patient along with you  Sincerely,        Farhana Collins MD        CC: No Recipients  Farhana Collins MD  1/25/2023  9:36 AM  Signed  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 lifting kegs  Patient denies any numbness or tingling in right upper extremity  Patient tried taking naproxen for two days, which did not help her pain  Patient is not currently taking any oral pain medications  Patient tried two braces for her elbow with some relief of symptoms while wearing the brace         ROS:   General: No fever, no chills, no weight loss, no weight gain  HEENT:  No loss of hearing, no nose bleeds, no sore throat  Eyes:  No eye pain, no red eyes, no visual disturbance  Respiratory:  No cough, no shortness of breath, no wheezing  Cardiovascular:  No chest pain, no palpitations, no edema  GI: No abdominal pain, no nausea, no vomiting  Endocrine: No frequent urination, no excessive thirst  Urinary:  No dysuria, no hematuria, no incontinence  Musculoskeletal: see HPI and PE  Skin:  No rash, no wounds  Neurological:  No dizziness, no headache, no numbness  Psychiatric:  No difficulty concentrating, no depression, no suicide thoughts, no anxiety  Review of all other systems is negative    PMH:  Past Medical History:   Diagnosis Date   • No pertinent past medical history        PSH:  Past Surgical History:   Procedure Laterality Date   • LUNG SURGERY     • LUNG SURGERY Left        Medications:  Current Outpatient Medications   Medication Sig Dispense Refill   • naproxen (Naprosyn) 500 mg tablet Take 1 tablet (500 mg total) by mouth 2 (two) times a day with meals (Patient taking differently: Take 500 mg by mouth 2 (two) times a day as needed) 30 tablet 0     No current facility-administered medications for this visit  Allergies:  No Known Allergies    Family History:  Family History   Problem Relation Age of Onset   • Lupus Mother    • Prostate cancer Father        Social History:  Social History     Occupational History   • Not on file   Tobacco Use   • Smoking status: Former     Packs/day: 1 00     Types: Cigarettes   • Smokeless tobacco: Current   Vaping Use   • Vaping Use: Every day   • Substances: Nicotine, Flavoring   Substance and Sexual Activity   • Alcohol use: Yes     Comment: occ  • Drug use: No   • Sexual activity: Not on file       Physical Exam:  General :  Alert, cooperative, no distress, appears stated age  Blood pressure 120/74, pulse 97, resp  rate 18, height 5' 7" (1 702 m), weight 85 3 kg (188 lb), last menstrual period 01/15/2023, SpO2 96 %  Head:  Normocephalic, without obvious abnormality, atraumatic   Eyes:  Conjunctiva/corneas clear, EOM's intact,   Ears: Both ears normal appearance, no hearing deficits      Nose: Nares normal, septum midline, no drainage    Neck: Supple,  trachea midline, no adenopathy, no tenderness, no mass   Back:   Symmetric, no curvature, ROM normal, no tenderness   Lungs:   Respirations unlabored   Chest Wall:  No tenderness or deformity   Extremities: Extremities normal, atraumatic, no cyanosis or edema      Pulses: 2+ and symmetric   Skin: Skin color, texture, turgor normal, no rashes or lesions      Neurologic: Normal Right Elbow Exam     Tenderness   The patient is experiencing tenderness in the lateral epicondyle  Range of Motion   The patient has normal right elbow ROM  Muscle Strength   The patient has normal right elbow strength  Tests   Varus: negative  Valgus: negative  Tinel's sign (cubital tunnel): negative    Other   Erythema: absent  Scars: absent  Sensation: normal  Pulse: present    Comments:  Pain with resisted wrist extension  Hand/upper extremity injection: R elbow  Universal Protocol:  Consent: Verbal consent obtained  Risks and benefits: risks, benefits and alternatives were discussed  Consent given by: patient  Patient understanding: patient states understanding of the procedure being performed  Site marked: the operative site was marked  Patient identity confirmed: verbally with patient    Supporting Documentation  Indications: pain   Procedure Details  Condition:lateral epicondylitis Site: R elbow   Needle size: 27 G  Medications administered: 1 mL lidocaine 1 %; 1 mL methylPREDNISolone acetate 40 mg/mL    Patient tolerance: patient tolerated the procedure well with no immediate complications  Dressing:  Sterile dressing applied             Imaging Studies: The following imaging studies were reviewed in office today  My findings are noted  Xrays of right elbow obtained today shows no acute findings of fracture or dislocation  No significant degenerative findings seen  Assessment  Encounter Diagnoses   Name Primary? • Right lateral epicondylitis Yes   • Pain in right elbow          Plan:  Patient is a 81-year-old female with right lateral epicondylitis   *It was discussed that the patient has lateral epicondylitis  Conservative treatment options were discussed including exercises, bracing, and topical voltaren gel  Patient was advised on eccentric exercises to perform at home    *The role of injections were discussed including risks and benefits such as there are no long term benefits of cortisone injections and potential risks of tendon damage with repeat injections  Patient states she would like to have injection today  Injection given without immediate complication  *Provided with packet of information on lateral epicondylitis  *Patient may follow-up in 2 months for re-evaluation         Scribe Attestation    I,:  Ennis Schlatter, PA-C am acting as a scribe while in the presence of the attending physician :       I,:  Jaison Gamboa MD personally performed the services described in this documentation    as scribed in my presence :

## 2023-01-25 NOTE — PROGRESS NOTES
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 lifting kegs  Patient denies any numbness or tingling in right upper extremity  Patient tried taking naproxen for two days, which did not help her pain  Patient is not currently taking any oral pain medications  Patient tried two braces for her elbow with some relief of symptoms while wearing the brace         ROS:   General: No fever, no chills, no weight loss, no weight gain  HEENT:  No loss of hearing, no nose bleeds, no sore throat  Eyes:  No eye pain, no red eyes, no visual disturbance  Respiratory:  No cough, no shortness of breath, no wheezing  Cardiovascular:  No chest pain, no palpitations, no edema  GI: No abdominal pain, no nausea, no vomiting  Endocrine: No frequent urination, no excessive thirst  Urinary:  No dysuria, no hematuria, no incontinence  Musculoskeletal: see HPI and PE  Skin:  No rash, no wounds  Neurological:  No dizziness, no headache, no numbness  Psychiatric:  No difficulty concentrating, no depression, no suicide thoughts, no anxiety  Review of all other systems is negative    PMH:  Past Medical History:   Diagnosis Date   • No pertinent past medical history        PSH:  Past Surgical History:   Procedure Laterality Date   • LUNG SURGERY     • LUNG SURGERY Left        Medications:  Current Outpatient Medications   Medication Sig Dispense Refill   • naproxen (Naprosyn) 500 mg tablet Take 1 tablet (500 mg total) by mouth 2 (two) times a day with meals (Patient taking differently: Take 500 mg by mouth 2 (two) times a day as needed) 30 tablet 0     No current facility-administered medications for this visit  Allergies:  No Known Allergies    Family History:  Family History   Problem Relation Age of Onset   • Lupus Mother    • Prostate cancer Father        Social History:  Social History     Occupational History   • Not on file   Tobacco Use   • Smoking status: Former     Packs/day: 1 00     Types: Cigarettes   • Smokeless tobacco: Current   Vaping Use   • Vaping Use: Every day   • Substances: Nicotine, Flavoring   Substance and Sexual Activity   • Alcohol use: Yes     Comment: occ  • Drug use: No   • Sexual activity: Not on file       Physical Exam:  General :  Alert, cooperative, no distress, appears stated age  Blood pressure 120/74, pulse 97, resp  rate 18, height 5' 7" (1 702 m), weight 85 3 kg (188 lb), last menstrual period 01/15/2023, SpO2 96 %  Head:  Normocephalic, without obvious abnormality, atraumatic   Eyes:  Conjunctiva/corneas clear, EOM's intact,   Ears: Both ears normal appearance, no hearing deficits  Nose: Nares normal, septum midline, no drainage    Neck: Supple,  trachea midline, no adenopathy, no tenderness, no mass   Back:   Symmetric, no curvature, ROM normal, no tenderness   Lungs:   Respirations unlabored   Chest Wall:  No tenderness or deformity   Extremities: Extremities normal, atraumatic, no cyanosis or edema      Pulses: 2+ and symmetric   Skin: Skin color, texture, turgor normal, no rashes or lesions      Neurologic: Normal           Right Elbow Exam     Tenderness   The patient is experiencing tenderness in the lateral epicondyle  Range of Motion   The patient has normal right elbow ROM  Muscle Strength   The patient has normal right elbow strength  Tests   Varus: negative  Valgus: negative  Tinel's sign (cubital tunnel): negative    Other   Erythema: absent  Scars: absent  Sensation: normal  Pulse: present    Comments:  Pain with resisted wrist extension          Hand/upper extremity injection: R elbow  Universal Protocol:  Consent: Verbal consent obtained  Risks and benefits: risks, benefits and alternatives were discussed  Consent given by: patient  Patient understanding: patient states understanding of the procedure being performed  Site marked: the operative site was marked  Patient identity confirmed: verbally with patient    Supporting Documentation  Indications: pain   Procedure Details  Condition:lateral epicondylitis Site: R elbow   Needle size: 27 G  Medications administered: 1 mL lidocaine 1 %; 1 mL methylPREDNISolone acetate 40 mg/mL    Patient tolerance: patient tolerated the procedure well with no immediate complications  Dressing:  Sterile dressing applied             Imaging Studies: The following imaging studies were reviewed in office today  My findings are noted  Xrays of right elbow obtained today shows no acute findings of fracture or dislocation  No significant degenerative findings seen  Assessment  Encounter Diagnoses   Name Primary? • Right lateral epicondylitis Yes   • Pain in right elbow          Plan:  Patient is a 28-year-old female with right lateral epicondylitis   *It was discussed that the patient has lateral epicondylitis  Conservative treatment options were discussed including exercises, bracing, and topical voltaren gel  Patient was advised on eccentric exercises to perform at home  *The role of injections were discussed including risks and benefits such as there are no long term benefits of cortisone injections and potential risks of tendon damage with repeat injections  Patient states she would like to have injection today  Injection given without immediate complication  *Provided with packet of information on lateral epicondylitis  *Patient may follow-up in 2 months for re-evaluation         Scribe Attestation    I,:  Juarez Raymond PA-C am acting as a scribe while in the presence of the attending physician :       I,:  Christal Cuellar MD personally performed the services described in this documentation    as scribed in my presence :